# Patient Record
Sex: FEMALE | Race: OTHER | Employment: UNEMPLOYED | URBAN - METROPOLITAN AREA
[De-identification: names, ages, dates, MRNs, and addresses within clinical notes are randomized per-mention and may not be internally consistent; named-entity substitution may affect disease eponyms.]

---

## 2023-10-23 ENCOUNTER — HOSPITAL ENCOUNTER (OUTPATIENT)
Dept: MAMMOGRAPHY | Age: 43
Discharge: HOME | End: 2023-10-25
Payer: COMMERCIAL

## 2023-10-23 DIAGNOSIS — Z12.31 SCREENING MAMMOGRAM FOR BREAST CANCER: ICD-10-CM

## 2023-10-23 PROCEDURE — 77063 BREAST TOMOSYNTHESIS BI: CPT

## 2024-05-24 ENCOUNTER — OFFICE VISIT (OUTPATIENT)
Dept: FAMILY MEDICINE CLINIC | Age: 44
End: 2024-05-24
Payer: COMMERCIAL

## 2024-05-24 ENCOUNTER — TELEPHONE (OUTPATIENT)
Dept: FAMILY MEDICINE CLINIC | Age: 44
End: 2024-05-24

## 2024-05-24 VITALS
SYSTOLIC BLOOD PRESSURE: 124 MMHG | BODY MASS INDEX: 28 KG/M2 | WEIGHT: 164 LBS | HEIGHT: 64 IN | DIASTOLIC BLOOD PRESSURE: 82 MMHG | OXYGEN SATURATION: 98 % | HEART RATE: 79 BPM

## 2024-05-24 DIAGNOSIS — Z13.0 SCREENING FOR IRON DEFICIENCY ANEMIA: ICD-10-CM

## 2024-05-24 DIAGNOSIS — Z76.89 ESTABLISHING CARE WITH NEW DOCTOR, ENCOUNTER FOR: ICD-10-CM

## 2024-05-24 DIAGNOSIS — E55.9 VITAMIN D DEFICIENCY: ICD-10-CM

## 2024-05-24 DIAGNOSIS — E58 CALCIUM DEFICIENCY: ICD-10-CM

## 2024-05-24 DIAGNOSIS — Z12.4 CERVICAL CANCER SCREENING: ICD-10-CM

## 2024-05-24 DIAGNOSIS — Z13.220 SCREENING FOR HYPERLIPIDEMIA: ICD-10-CM

## 2024-05-24 DIAGNOSIS — I10 PRIMARY HYPERTENSION: Chronic | ICD-10-CM

## 2024-05-24 DIAGNOSIS — Z13.6 SCREENING FOR CARDIOVASCULAR CONDITION: ICD-10-CM

## 2024-05-24 DIAGNOSIS — E53.8 VITAMIN B12 DEFICIENCY: ICD-10-CM

## 2024-05-24 DIAGNOSIS — E11.9 TYPE 2 DIABETES MELLITUS WITHOUT COMPLICATION, WITH LONG-TERM CURRENT USE OF INSULIN (HCC): Primary | Chronic | ICD-10-CM

## 2024-05-24 DIAGNOSIS — E78.5 HYPERLIPIDEMIA, UNSPECIFIED HYPERLIPIDEMIA TYPE: Chronic | ICD-10-CM

## 2024-05-24 DIAGNOSIS — Z13.29 THYROID DISORDER SCREEN: ICD-10-CM

## 2024-05-24 DIAGNOSIS — Z79.4 TYPE 2 DIABETES MELLITUS WITHOUT COMPLICATION, WITH LONG-TERM CURRENT USE OF INSULIN (HCC): Primary | Chronic | ICD-10-CM

## 2024-05-24 LAB — HBA1C MFR BLD: 6.9 %

## 2024-05-24 PROCEDURE — 3074F SYST BP LT 130 MM HG: CPT

## 2024-05-24 PROCEDURE — 3079F DIAST BP 80-89 MM HG: CPT

## 2024-05-24 PROCEDURE — 99202 OFFICE O/P NEW SF 15 MIN: CPT

## 2024-05-24 PROCEDURE — 3044F HG A1C LEVEL LT 7.0%: CPT

## 2024-05-24 PROCEDURE — 83036 HEMOGLOBIN GLYCOSYLATED A1C: CPT

## 2024-05-24 RX ORDER — SEMAGLUTIDE 0.68 MG/ML
0.25 INJECTION, SOLUTION SUBCUTANEOUS WEEKLY
Qty: 3 ML | Refills: 1 | Status: SHIPPED | OUTPATIENT
Start: 2024-05-24 | End: 2024-05-24

## 2024-05-24 RX ORDER — ATORVASTATIN CALCIUM 20 MG/1
20 TABLET, FILM COATED ORAL EVERY EVENING
Qty: 90 TABLET | Refills: 0 | Status: SHIPPED | OUTPATIENT
Start: 2024-05-24 | End: 2024-08-22

## 2024-05-24 RX ORDER — ENALAPRIL MALEATE 2.5 MG/1
2.5 TABLET ORAL DAILY
Qty: 90 TABLET | Refills: 0 | Status: SHIPPED | OUTPATIENT
Start: 2024-05-24 | End: 2024-08-22

## 2024-05-24 RX ORDER — ENALAPRIL MALEATE 2.5 MG/1
2.5 TABLET ORAL DAILY
COMMUNITY
Start: 2023-10-26 | End: 2024-05-24 | Stop reason: SDUPTHER

## 2024-05-24 RX ORDER — ATORVASTATIN CALCIUM 20 MG/1
20 TABLET, FILM COATED ORAL EVERY EVENING
COMMUNITY
Start: 2023-06-05 | End: 2024-05-24 | Stop reason: SDUPTHER

## 2024-05-24 SDOH — ECONOMIC STABILITY: FOOD INSECURITY: WITHIN THE PAST 12 MONTHS, THE FOOD YOU BOUGHT JUST DIDN'T LAST AND YOU DIDN'T HAVE MONEY TO GET MORE.: NEVER TRUE

## 2024-05-24 SDOH — ECONOMIC STABILITY: FOOD INSECURITY: WITHIN THE PAST 12 MONTHS, YOU WORRIED THAT YOUR FOOD WOULD RUN OUT BEFORE YOU GOT MONEY TO BUY MORE.: NEVER TRUE

## 2024-05-24 SDOH — ECONOMIC STABILITY: INCOME INSECURITY: HOW HARD IS IT FOR YOU TO PAY FOR THE VERY BASICS LIKE FOOD, HOUSING, MEDICAL CARE, AND HEATING?: NOT HARD AT ALL

## 2024-05-24 SDOH — ECONOMIC STABILITY: HOUSING INSECURITY
IN THE LAST 12 MONTHS, WAS THERE A TIME WHEN YOU DID NOT HAVE A STEADY PLACE TO SLEEP OR SLEPT IN A SHELTER (INCLUDING NOW)?: NO

## 2024-05-24 ASSESSMENT — PATIENT HEALTH QUESTIONNAIRE - PHQ9
SUM OF ALL RESPONSES TO PHQ QUESTIONS 1-9: 8
5. POOR APPETITE OR OVEREATING: SEVERAL DAYS
SUM OF ALL RESPONSES TO PHQ QUESTIONS 1-9: 8
4. FEELING TIRED OR HAVING LITTLE ENERGY: MORE THAN HALF THE DAYS
1. LITTLE INTEREST OR PLEASURE IN DOING THINGS: SEVERAL DAYS
3. TROUBLE FALLING OR STAYING ASLEEP: MORE THAN HALF THE DAYS
10. IF YOU CHECKED OFF ANY PROBLEMS, HOW DIFFICULT HAVE THESE PROBLEMS MADE IT FOR YOU TO DO YOUR WORK, TAKE CARE OF THINGS AT HOME, OR GET ALONG WITH OTHER PEOPLE: NOT DIFFICULT AT ALL
8. MOVING OR SPEAKING SO SLOWLY THAT OTHER PEOPLE COULD HAVE NOTICED. OR THE OPPOSITE, BEING SO FIGETY OR RESTLESS THAT YOU HAVE BEEN MOVING AROUND A LOT MORE THAN USUAL: NOT AT ALL
9. THOUGHTS THAT YOU WOULD BE BETTER OFF DEAD, OR OF HURTING YOURSELF: NOT AT ALL
2. FEELING DOWN, DEPRESSED OR HOPELESS: SEVERAL DAYS
SUM OF ALL RESPONSES TO PHQ QUESTIONS 1-9: 8
6. FEELING BAD ABOUT YOURSELF - OR THAT YOU ARE A FAILURE OR HAVE LET YOURSELF OR YOUR FAMILY DOWN: NOT AT ALL
7. TROUBLE CONCENTRATING ON THINGS, SUCH AS READING THE NEWSPAPER OR WATCHING TELEVISION: SEVERAL DAYS
SUM OF ALL RESPONSES TO PHQ QUESTIONS 1-9: 8
SUM OF ALL RESPONSES TO PHQ9 QUESTIONS 1 & 2: 2

## 2024-05-24 NOTE — PROGRESS NOTES
MPHX Shanthi   [address] [facility]  [id number]  5/30/2024    Raquel Dickey is a 44 y.o. female who presents today for her medical conditions and/or complaints as noted below.    Raquel Dickey is scheduled today for Diabetes, Establish Care, and Other (Pap & Mammogram orders /Hormonal testing )  .      HPI:     Patient is here today to establish care and discuss medication refills.  Patient reports a PMH of primary hypertension, type 2 diabetes with insulin use and hyperlipidemia.  Patient states she is been off of her insulin since October 2024.  A1c in the office today 6.9% which is at goal range.  She denies any recent episodes of needing to be hospitalized or contact emergency services for hyperglycemia or hypoglycemia.  Blood pressure in the office today is 124/82 which is at goal range.  Patient states she tolerates all her current medications without any side effects.    Patient has no further concerns for today's visit.  Previous notes reviewed in the chart.        Vitals:    05/24/24 0900   BP: 124/82   Pulse: 79   SpO2: 98%   Weight: 74.4 kg (164 lb)   Height: 1.626 m (5' 4\")      No past medical history on file.   No past surgical history on file.  Family History   Problem Relation Age of Onset    Diabetes Mother     Cancer Father      Social History     Tobacco Use    Smoking status: Never    Smokeless tobacco: Never   Substance Use Topics    Alcohol use: Never      Current Outpatient Medications   Medication Sig Dispense Refill    atorvastatin (LIPITOR) 20 MG tablet Take 1 tablet by mouth every evening 90 tablet 0    Continuous Glucose Sensor (FREESTYLE ROCIO 2 SENSOR) [facility] USE AS DIRECTED 3 each 2    enalapril (VASOTEC) 2.5 MG tablet Take 1 tablet by mouth daily 90 tablet 0    metFORMIN (GLUCOPHAGE) 1000 MG tablet Take 1 tablet by mouth 2 times daily (with meals) 60 tablet 1    Oyster Shell 500 MG TABS Take 1 tablet by mouth daily 90 tablet 0     No current facility-administered medications for

## 2024-05-30 PROBLEM — I10 PRIMARY HYPERTENSION: Chronic | Status: ACTIVE | Noted: 2024-05-24

## 2024-05-30 PROBLEM — E78.5 HYPERLIPIDEMIA: Chronic | Status: ACTIVE | Noted: 2024-05-24

## 2024-05-30 ASSESSMENT — ENCOUNTER SYMPTOMS
SINUS PRESSURE: 0
TROUBLE SWALLOWING: 0
SHORTNESS OF BREATH: 0
SINUS PAIN: 0
EYE REDNESS: 0
NAUSEA: 0
ABDOMINAL PAIN: 0
COUGH: 0
CHEST TIGHTNESS: 0
WHEEZING: 0
EYE ITCHING: 0
EYE DISCHARGE: 0
VOMITING: 0
SORE THROAT: 0
DIARRHEA: 0

## 2024-05-30 NOTE — ASSESSMENT & PLAN NOTE
Borderline controlled, continue current medications, continue current treatment plan, medication adherence emphasized, and lifestyle modifications recommended.    Diabetes education provided today:    Diabetic Neuropathy: signs and therapy.  Foot care: advised to wash feet daily, pat dry and apply lotion at night, avoiding between toes. Need to look at feet daily and report to a physician any signs of inflammation or skin damage. Discussed diabetes shoes and socks.  Diabetic retinopathy: the most frequent cause of blindness in [place] currently. Measures to prevent that.  Diabetic nephropathy: Kidney function, microalbumin as a sign of diabetic nephropathy. Stages of kidney disease.  Nutrition as a mainstream of diabetes therapy. Kinsley about label reading.  Carbs: good carbs and bad carbs, importance of carb counting, incorporation of protein with each meal to reduce Glycemic index, importance of portions, Carb/insulin ratio.  Fats: Good fats and bad fats, meal planning and supplements.  Physical activity: advised to exercise 5-7 days a week 30-60 mins at least. Discussed how it affects BS readings.  Continuous Glucose monitor. How it works and checks blood sugars every 5 min. for 4 days during our tests.  Different diabetic medications.  Rotation of sites for subcutaneous medication injection.

## 2024-11-06 DIAGNOSIS — E11.9 TYPE 2 DIABETES MELLITUS WITHOUT COMPLICATION, WITH LONG-TERM CURRENT USE OF INSULIN (HCC): Chronic | ICD-10-CM

## 2024-11-06 DIAGNOSIS — Z79.4 TYPE 2 DIABETES MELLITUS WITHOUT COMPLICATION, WITH LONG-TERM CURRENT USE OF INSULIN (HCC): Chronic | ICD-10-CM

## 2024-11-06 NOTE — TELEPHONE ENCOUNTER
Requested Prescriptions     Pending Prescriptions Disp Refills    metFORMIN (GLUCOPHAGE) 1000 MG tablet 60 tablet 1     Sig: Take 1 tablet by mouth 2 times daily (with meals)

## 2024-11-08 DIAGNOSIS — E11.9 TYPE 2 DIABETES MELLITUS WITHOUT COMPLICATION, WITH LONG-TERM CURRENT USE OF INSULIN (HCC): Chronic | ICD-10-CM

## 2024-11-08 DIAGNOSIS — Z79.4 TYPE 2 DIABETES MELLITUS WITHOUT COMPLICATION, WITH LONG-TERM CURRENT USE OF INSULIN (HCC): Chronic | ICD-10-CM

## 2024-11-08 NOTE — TELEPHONE ENCOUNTER
Raquel Dickey is calling to request a refill on the following medication(s):    Medication Request:  Requested Prescriptions     Pending Prescriptions Disp Refills    metFORMIN (GLUCOPHAGE) 1000 MG tablet [Pharmacy Med Name: metFORMIN HCl Oral Tablet 1000 MG] 60 tablet 0     Sig: TAKE 1 TABLET BY MOUTH 2 TIMES A DAY WITH MEALS       Last Visit Date (If Applicable):  Visit date not found    Next Visit Date:    Visit date not found

## 2024-11-20 DIAGNOSIS — Z79.4 TYPE 2 DIABETES MELLITUS WITHOUT COMPLICATION, WITH LONG-TERM CURRENT USE OF INSULIN (HCC): Chronic | ICD-10-CM

## 2024-11-20 DIAGNOSIS — E11.9 TYPE 2 DIABETES MELLITUS WITHOUT COMPLICATION, WITH LONG-TERM CURRENT USE OF INSULIN (HCC): Chronic | ICD-10-CM

## 2024-11-20 NOTE — TELEPHONE ENCOUNTER
Raquel Dickey is calling to request a refill on the following medication(s):    Medication Request:  Requested Prescriptions     Pending Prescriptions Disp Refills    Continuous Glucose Sensor (FREESTYLE ROCIO 2 SENSOR) MISC [Pharmacy Med Name: FreeStyle Rocio 2 Sensor Miscellaneous] 1 each 0     Sig: USE AS DIRECTED. CHANGE SENSOR EVERY 14 DAYS       Last Visit Date (If Applicable):  5/24/2024    Next Visit Date:    Visit date not found

## 2024-12-10 ENCOUNTER — TELEMEDICINE (OUTPATIENT)
Dept: FAMILY MEDICINE CLINIC | Age: 44
End: 2024-12-10

## 2024-12-10 DIAGNOSIS — Z79.4 TYPE 2 DIABETES MELLITUS WITHOUT COMPLICATION, WITH LONG-TERM CURRENT USE OF INSULIN (HCC): Chronic | ICD-10-CM

## 2024-12-10 DIAGNOSIS — I10 PRIMARY HYPERTENSION: Chronic | ICD-10-CM

## 2024-12-10 DIAGNOSIS — Z13.0 SCREENING FOR IRON DEFICIENCY ANEMIA: Primary | ICD-10-CM

## 2024-12-10 DIAGNOSIS — E55.9 VITAMIN D DEFICIENCY: ICD-10-CM

## 2024-12-10 DIAGNOSIS — E78.5 HYPERLIPIDEMIA, UNSPECIFIED HYPERLIPIDEMIA TYPE: Chronic | ICD-10-CM

## 2024-12-10 DIAGNOSIS — Z13.220 SCREENING FOR HYPERLIPIDEMIA: ICD-10-CM

## 2024-12-10 DIAGNOSIS — Z12.4 CERVICAL CANCER SCREENING: ICD-10-CM

## 2024-12-10 DIAGNOSIS — E53.8 VITAMIN B12 DEFICIENCY: ICD-10-CM

## 2024-12-10 DIAGNOSIS — Z13.29 THYROID DISORDER SCREEN: ICD-10-CM

## 2024-12-10 DIAGNOSIS — Z13.6 SCREENING FOR CARDIOVASCULAR CONDITION: ICD-10-CM

## 2024-12-10 DIAGNOSIS — E11.9 TYPE 2 DIABETES MELLITUS WITHOUT COMPLICATION, WITH LONG-TERM CURRENT USE OF INSULIN (HCC): Chronic | ICD-10-CM

## 2024-12-10 DIAGNOSIS — Z86.39 HISTORY OF HYPERGLYCEMIA: ICD-10-CM

## 2024-12-10 RX ORDER — ENALAPRIL MALEATE 2.5 MG/1
2.5 TABLET ORAL DAILY
Qty: 90 TABLET | Refills: 0 | Status: SHIPPED | OUTPATIENT
Start: 2024-12-10 | End: 2025-03-10

## 2024-12-10 RX ORDER — ATORVASTATIN CALCIUM 20 MG/1
20 TABLET, FILM COATED ORAL EVERY EVENING
Qty: 90 TABLET | Refills: 1 | Status: SHIPPED | OUTPATIENT
Start: 2024-12-10 | End: 2025-06-08

## 2024-12-10 RX ORDER — OYSTER SHELL CALCIUM WITH VITAMIN D 500; 200 MG/1; [IU]/1
1 TABLET, FILM COATED ORAL EVERY MORNING
COMMUNITY
Start: 2024-05-30 | End: 2025-05-31

## 2024-12-10 ASSESSMENT — ENCOUNTER SYMPTOMS
VOMITING: 0
SINUS PAIN: 0
DIARRHEA: 0
EYE ITCHING: 0
WHEEZING: 0
TROUBLE SWALLOWING: 0
EYE DISCHARGE: 0
EYE REDNESS: 0
SINUS PRESSURE: 0
SORE THROAT: 0
COUGH: 0
ABDOMINAL PAIN: 0
CHEST TIGHTNESS: 0
NAUSEA: 0
SHORTNESS OF BREATH: 0

## 2024-12-10 NOTE — ASSESSMENT & PLAN NOTE
Chronic, at goal (stable), continue current treatment plan, medication adherence emphasized, and lifestyle modifications recommended    Orders:    enalapril (VASOTEC) 2.5 MG tablet; Take 1 tablet by mouth daily

## 2024-12-10 NOTE — ASSESSMENT & PLAN NOTE
Chronic, at goal (stable), continue current treatment plan, medication adherence emphasized, and lifestyle modifications recommended    Orders:    Continuous Glucose Sensor (FREESTYLE ROCIO 2 SENSOR) [facility]; USE AS DIRECTED. CHANGE SENSOR EVERY 14 DAYS    metFORMIN (GLUCOPHAGE) 1000 MG tablet; Take 1 tablet by mouth 2 times daily (with meals)

## 2024-12-10 NOTE — PROGRESS NOTES
Raquel Dickey, was evaluated through a synchronous (real-time) audio-video encounter. The patient (or guardian if applicable) is aware that this is a billable service, which includes applicable co-pays. This Virtual Visit was conducted with patient's (and/or legal guardian's) consent. Patient identification was verified, and a caregiver was present when appropriate.   The patient was located at Home: [address] [place] [address] Dr M[address] [place] [address]  Provider was located at Facility (Appt Dept): [address] [place] [address] YOLI Maki  [place] [address]  Confirm you are appropriately licensed, registered, or certified to deliver care in the state where the patient is located as indicated above. If you are not or unsure, please re-schedule the visit: Yes, I confirm.     Raquel Dickey (:  1980) is a Established patient, presenting virtually for evaluation of the following:      Below is the assessment and plan developed based on review of pertinent history, physical exam, labs, studies, and medications.     Assessment & Plan  Screening for iron deficiency anemia    Orders:    CBC with Auto Differential; Future    Screening for hyperlipidemia    Orders:    Lipid, Fasting; Future    Thyroid disorder screen     Orders:    TSH with Reflex; Future    Vitamin D deficiency     Orders:    Vitamin D 25 Hydroxy; Future    Screening for cardiovascular condition     Orders:    Comprehensive Metabolic Panel; Future    Vitamin B12 deficiency     Orders:    Vitamin B12; Future    History of hyperglycemia     Orders:    Hemoglobin A1C; Future    Hyperlipidemia, unspecified hyperlipidemia type   Chronic, at goal (stable), continue current treatment plan, medication adherence emphasized, and lifestyle modifications recommended    Orders:    atorvastatin (LIPITOR) 20 MG tablet; Take 1 tablet by mouth every evening    Type 2 diabetes mellitus without complication, with long-term current use of insulin (HCC)   Chronic, at goal (stable), continue current

## 2024-12-10 NOTE — ASSESSMENT & PLAN NOTE
Chronic, at goal (stable), continue current treatment plan, medication adherence emphasized, and lifestyle modifications recommended    Orders:    atorvastatin (LIPITOR) 20 MG tablet; Take 1 tablet by mouth every evening

## 2025-03-04 ENCOUNTER — OFFICE VISIT (OUTPATIENT)
Dept: OBGYN CLINIC | Age: 45
End: 2025-03-04

## 2025-03-04 ENCOUNTER — HOSPITAL ENCOUNTER (OUTPATIENT)
Age: 45
Setting detail: SPECIMEN
Discharge: HOME | End: 2025-03-04

## 2025-03-04 VITALS — BODY MASS INDEX: 28.84 KG/M2 | WEIGHT: 168 LBS | DIASTOLIC BLOOD PRESSURE: 84 MMHG | SYSTOLIC BLOOD PRESSURE: 120 MMHG

## 2025-03-04 DIAGNOSIS — Z01.419 WELL WOMAN EXAM WITH ROUTINE GYNECOLOGICAL EXAM: Primary | ICD-10-CM

## 2025-03-04 DIAGNOSIS — Z12.31 ENCOUNTER FOR SCREENING MAMMOGRAM FOR MALIGNANT NEOPLASM OF BREAST: ICD-10-CM

## 2025-03-04 DIAGNOSIS — N92.0 MENORRHAGIA WITH REGULAR CYCLE: ICD-10-CM

## 2025-03-04 RX ORDER — NORETHINDRONE ACETATE AND ETHINYL ESTRADIOL .03; 1.5 MG/1; MG/1
1 TABLET ORAL DAILY
Qty: 3 PACKET | Refills: 4 | Status: SHIPPED | OUTPATIENT
Start: 2025-03-04 | End: 2025-03-25

## 2025-03-04 SDOH — ECONOMIC STABILITY: FOOD INSECURITY: WITHIN THE PAST 12 MONTHS, THE FOOD YOU BOUGHT JUST DIDN'T LAST AND YOU DIDN'T HAVE MONEY TO GET MORE.: NEVER TRUE

## 2025-03-04 SDOH — ECONOMIC STABILITY: FOOD INSECURITY: WITHIN THE PAST 12 MONTHS, YOU WORRIED THAT YOUR FOOD WOULD RUN OUT BEFORE YOU GOT MONEY TO BUY MORE.: NEVER TRUE

## 2025-03-04 ASSESSMENT — PATIENT HEALTH QUESTIONNAIRE - PHQ9
SUM OF ALL RESPONSES TO PHQ QUESTIONS 1-9: 0
2. FEELING DOWN, DEPRESSED OR HOPELESS: NOT AT ALL
1. LITTLE INTEREST OR PLEASURE IN DOING THINGS: NOT AT ALL
SUM OF ALL RESPONSES TO PHQ QUESTIONS 1-9: 0

## 2025-03-04 NOTE — PROGRESS NOTES
check.     Diagnosis Orders   1. Well woman exam with routine gynecological exam        2. Encounter for screening mammogram for malignant neoplasm of breast        3. Menorrhagia with regular cycle             Patient Active Problem List    Diagnosis Date Noted    Type 2 diabetes mellitus without complication, with long-term current use of insulin (HCC) 05/24/2024    Hyperlipidemia 05/24/2024    Calcium deficiency 05/24/2024    Primary hypertension 05/24/2024         Counseling Completed:    Discussed need for repeat pap as per American Society for Colposcopy and Cervical Pathology guidelines.  Discussed need for mammograms every 1 year, If >41 yo and last mammogram was negative.  Discussed Calcium and Vitamin D dosing.  Discussed need for colonoscopy screening as well as onset for bone density testing.  Discussed birth control and barrier recommendations.  Discussed STD counseling and prevention.  Discussed Gardisil counseling for all patients 9-44 yo.  Hereditary Breast, Ovarian, Colon and Uterine Cancer screening discussed.          Tobacco & Secondary smoke risks discussed; with recommendation for cessation and avoidance.  Routine health maintenance per patients PCP discussed.      DO Delores Soliz [facility]  [id number]  [facility], [address]  3/4/2025, 11:16 AM

## 2025-03-11 ENCOUNTER — HOSPITAL ENCOUNTER (OUTPATIENT)
Age: 45
Discharge: HOME | End: 2025-03-13
Attending: STUDENT IN AN ORGANIZED HEALTH CARE EDUCATION/TRAINING PROGRAM

## 2025-03-11 ENCOUNTER — RESULTS FOLLOW-UP (OUTPATIENT)
Dept: LAB | Age: 45
End: 2025-03-11

## 2025-03-11 ENCOUNTER — RESULTS FOLLOW-UP (OUTPATIENT)
Age: 45
End: 2025-03-11

## 2025-03-11 DIAGNOSIS — Z12.31 ENCOUNTER FOR SCREENING MAMMOGRAM FOR MALIGNANT NEOPLASM OF BREAST: ICD-10-CM

## 2025-03-11 LAB — CYTOLOGY REPORT: NORMAL

## 2025-03-11 PROCEDURE — 77063 BREAST TOMOSYNTHESIS BI: CPT

## 2025-03-12 DIAGNOSIS — Z01.419 WELL WOMAN EXAM WITH ROUTINE GYNECOLOGICAL EXAM: Primary | ICD-10-CM

## 2025-03-13 LAB
HPV I/H RISK 4 DNA CVX QL NAA+PROBE: NOT DETECTED
HPV SAMPLE: NORMAL
HPV, INTERPRETATION: NORMAL
HPV16 DNA CVX QL NAA+PROBE: NOT DETECTED
HPV18 DNA CVX QL NAA+PROBE: NOT DETECTED
SPECIMEN DESCRIPTION: NORMAL

## 2025-05-02 ENCOUNTER — HOSPITAL ENCOUNTER (OUTPATIENT)
Age: 45
Setting detail: SPECIMEN
Discharge: HOME | End: 2025-05-02

## 2025-05-02 DIAGNOSIS — Z13.6 SCREENING FOR CARDIOVASCULAR CONDITION: ICD-10-CM

## 2025-05-02 DIAGNOSIS — Z13.220 SCREENING FOR HYPERLIPIDEMIA: ICD-10-CM

## 2025-05-02 DIAGNOSIS — E53.8 VITAMIN B12 DEFICIENCY: ICD-10-CM

## 2025-05-02 DIAGNOSIS — Z13.0 SCREENING FOR IRON DEFICIENCY ANEMIA: ICD-10-CM

## 2025-05-02 DIAGNOSIS — Z86.39 HISTORY OF HYPERGLYCEMIA: ICD-10-CM

## 2025-05-02 DIAGNOSIS — E55.9 VITAMIN D DEFICIENCY: ICD-10-CM

## 2025-05-02 DIAGNOSIS — Z13.29 THYROID DISORDER SCREEN: ICD-10-CM

## 2025-05-02 LAB
25(OH)D3 SERPL-MCNC: 19.7 NG/ML (ref 30–100)
ALBUMIN SERPL-MCNC: 4.2 G/DL (ref 3.5–5.2)
ALBUMIN/GLOB SERPL: 1.5 {RATIO} (ref 1–2.5)
ALP SERPL-CCNC: 66 U/L (ref 35–104)
ALT SERPL-CCNC: 31 U/L (ref 10–35)
ANION GAP SERPL CALCULATED.3IONS-SCNC: 13 MMOL/L (ref 9–16)
AST SERPL-CCNC: 19 U/L (ref 10–35)
BASOPHILS # BLD: 0.09 K/UL (ref 0–0.2)
BASOPHILS NFR BLD: 1 % (ref 0–2)
BILIRUB SERPL-MCNC: 0.3 MG/DL (ref 0–1.2)
BUN SERPL-MCNC: 9 MG/DL (ref 6–20)
CALCIUM SERPL-MCNC: 9.2 MG/DL (ref 8.6–10.4)
CHLORIDE SERPL-SCNC: 101 MMOL/L (ref 98–107)
CHOLEST SERPL-MCNC: 138 MG/DL (ref 0–199)
CHOLESTEROL/HDL RATIO: 3.6
CO2 SERPL-SCNC: 23 MMOL/L (ref 20–31)
CREAT SERPL-MCNC: 0.5 MG/DL (ref 0.6–0.9)
EOSINOPHIL # BLD: 0.1 K/UL (ref 0–0.44)
EOSINOPHILS RELATIVE PERCENT: 1 % (ref 1–4)
ERYTHROCYTE [DISTWIDTH] IN BLOOD BY AUTOMATED COUNT: 13.2 % (ref 11.8–14.4)
EST. AVERAGE GLUCOSE BLD GHB EST-MCNC: 140 MG/DL
GFR, ESTIMATED: >90 ML/MIN/1.73M2
GLUCOSE SERPL-MCNC: 162 MG/DL (ref 74–99)
HBA1C MFR BLD: 6.5 % (ref 4–6)
HCT VFR BLD AUTO: 37 % (ref 36.3–47.1)
HDLC SERPL-MCNC: 38 MG/DL
HGB BLD-MCNC: 11.3 G/DL (ref 11.9–15.1)
IMM GRANULOCYTES # BLD AUTO: <0.03 K/UL (ref 0–0.3)
IMM GRANULOCYTES NFR BLD: 0 %
LDLC SERPL CALC-MCNC: 50 MG/DL (ref 0–100)
LYMPHOCYTES NFR BLD: 1.96 K/UL (ref 1.1–3.7)
LYMPHOCYTES RELATIVE PERCENT: 27 % (ref 24–43)
MCH RBC QN AUTO: 25.2 PG (ref 25.2–33.5)
MCHC RBC AUTO-ENTMCNC: 30.5 G/DL (ref 28.4–34.8)
MCV RBC AUTO: 82.6 FL (ref 82.6–102.9)
MONOCYTES NFR BLD: 0.38 K/UL (ref 0.1–1.2)
MONOCYTES NFR BLD: 5 % (ref 3–12)
NEUTROPHILS NFR BLD: 66 % (ref 36–65)
NEUTS SEG NFR BLD: 4.65 K/UL (ref 1.5–8.1)
NRBC BLD-RTO: 0 PER 100 WBC
PLATELET # BLD AUTO: 323 K/UL (ref 138–453)
PMV BLD AUTO: 10.8 FL (ref 8.1–13.5)
POTASSIUM SERPL-SCNC: 4.1 MMOL/L (ref 3.7–5.3)
PROT SERPL-MCNC: 7 G/DL (ref 6.6–8.7)
RBC # BLD AUTO: 4.48 M/UL (ref 3.95–5.11)
SODIUM SERPL-SCNC: 137 MMOL/L (ref 136–145)
TRIGL SERPL-MCNC: 250 MG/DL (ref 0–149)
TSH SERPL DL<=0.05 MIU/L-ACNC: 0.81 UIU/ML (ref 0.27–4.2)
VIT B12 SERPL-MCNC: 494 PG/ML (ref 232–1245)
VLDLC SERPL CALC-MCNC: 50 MG/DL (ref 1–30)
WBC OTHER # BLD: 7.2 K/UL (ref 3.5–11.3)

## 2025-05-06 ENCOUNTER — OFFICE VISIT (OUTPATIENT)
Dept: OBGYN CLINIC | Age: 45
End: 2025-05-06

## 2025-05-06 VITALS — BODY MASS INDEX: 28.67 KG/M2 | SYSTOLIC BLOOD PRESSURE: 128 MMHG | WEIGHT: 167 LBS | DIASTOLIC BLOOD PRESSURE: 72 MMHG

## 2025-05-06 DIAGNOSIS — N92.0 MENORRHAGIA WITH REGULAR CYCLE: Primary | ICD-10-CM

## 2025-05-06 PROCEDURE — 99212 OFFICE O/P EST SF 10 MIN: CPT | Performed by: STUDENT IN AN ORGANIZED HEALTH CARE EDUCATION/TRAINING PROGRAM

## 2025-05-06 PROCEDURE — 3074F SYST BP LT 130 MM HG: CPT | Performed by: STUDENT IN AN ORGANIZED HEALTH CARE EDUCATION/TRAINING PROGRAM

## 2025-05-06 PROCEDURE — 3078F DIAST BP <80 MM HG: CPT | Performed by: STUDENT IN AN ORGANIZED HEALTH CARE EDUCATION/TRAINING PROGRAM

## 2025-05-06 RX ORDER — NORETHINDRONE ACETATE AND ETHINYL ESTRADIOL .03; 1.5 MG/1; MG/1
1 TABLET ORAL DAILY
Qty: 3 PACKET | Refills: 4 | Status: SHIPPED | OUTPATIENT
Start: 2025-05-06 | End: 2025-05-27

## 2025-05-06 NOTE — PROGRESS NOTES
OB/GYN Follow up Visit    Raquel Dickey  2025                       Primary Care Physician: Lefty Jin APRN - CNP    CC:   Chief Complaint   Patient presents with    Medication Check         HPI: Raquel Dickey is a 44 y.o. female   Patient's last menstrual period was 2025 (approximate).    The patient was seen and examined.   -placed on COCP for menorrhagia. Started meds two months ago. First menses was still heavy but second was much better.   -She is very happy with results and would like to continue using the pills to control menses  -we also discussed continuous method and that I would recommend a withdraw bleed once every 3 months to prevent breakthrough bleeding.      REVIEW OF SYSTEMS:   -Constitutional: (-) fever, (-) chills  -HEENT: (-) visual disturbances, (-)headaches  -Respiratory: (-) dyspnea, (-) cough  -Cardiovascular: (-) chest pain, (-) palpitations  -Gastrointestinal: (-) abdominal pain, (-) N/V, (-) diarrhea, (-) constipation  -Genitourinary: (-) vaginal discharge  -Hematologic: (-) bruising  -Immunologic/Lymphatic: (-) recent illness, (-) recent sick contact    ________________________________________________________________________      OBSTETRICAL HISTORY:  OB History    Para Term  AB Living   3 3 3      SAB IAB Ectopic Molar Multiple Live Births        3      # Outcome Date GA Lbr Adonis/2nd Weight Sex Type Anes PTL Lv   3 Term            2 Term            1 Term                PAST MEDICAL HISTORY:  No past medical history on file.    PAST SURGICAL HISTORY:                                                                No past surgical history on file.    MEDICATIONS:  Current Outpatient Medications   Medication Sig Dispense Refill    Norethindrone Acet-Ethinyl Est (LOESTRIN 1.5/30, 21,) 1.5-30 MG-MCG TABS Take 1 tablet by mouth daily for 21 days 3 packet 4    calcium-vitamin D (OSCAL-500) 500-5 MG-MCG TABS per tablet Take 1 tablet by mouth every morning

## 2025-05-09 ENCOUNTER — RESULTS FOLLOW-UP (OUTPATIENT)
Dept: FAMILY MEDICINE CLINIC | Age: 45
End: 2025-05-09

## 2025-05-09 DIAGNOSIS — E55.9 VITAMIN D DEFICIENCY: Primary | ICD-10-CM

## 2025-05-09 RX ORDER — ERGOCALCIFEROL 1.25 MG/1
50000 CAPSULE, LIQUID FILLED ORAL WEEKLY
Qty: 12 CAPSULE | Refills: 1 | Status: SHIPPED | OUTPATIENT
Start: 2025-05-09

## 2025-05-16 ENCOUNTER — TELEPHONE (OUTPATIENT)
Dept: FAMILY MEDICINE CLINIC | Age: 45
End: 2025-05-16

## 2025-05-16 DIAGNOSIS — E11.9 TYPE 2 DIABETES MELLITUS WITHOUT COMPLICATION, WITH LONG-TERM CURRENT USE OF INSULIN (HCC): Primary | Chronic | ICD-10-CM

## 2025-05-16 DIAGNOSIS — Z79.4 TYPE 2 DIABETES MELLITUS WITHOUT COMPLICATION, WITH LONG-TERM CURRENT USE OF INSULIN (HCC): Primary | Chronic | ICD-10-CM

## 2025-05-16 NOTE — TELEPHONE ENCOUNTER
Patient  called and stated that patient blood sugar has been steadily increasing  to high 190s to 200s despite taking her medications.

## 2025-05-19 NOTE — TELEPHONE ENCOUNTER
Patient  cancelled appointment for today due to them having to stay home with their child. They are requesting an alternative to ozempic due to her blood sugar being uncontrollable.

## 2025-05-20 RX ORDER — GLIPIZIDE 5 MG/1
5 TABLET ORAL 2 TIMES DAILY
Qty: 60 TABLET | Refills: 3 | Status: SHIPPED | OUTPATIENT
Start: 2025-05-20

## 2025-05-23 ENCOUNTER — TELEPHONE (OUTPATIENT)
Dept: FAMILY MEDICINE CLINIC | Age: 45
End: 2025-05-23

## 2025-05-23 DIAGNOSIS — E11.9 TYPE 2 DIABETES MELLITUS WITHOUT COMPLICATION, WITH LONG-TERM CURRENT USE OF INSULIN (HCC): Primary | Chronic | ICD-10-CM

## 2025-05-23 DIAGNOSIS — Z79.4 TYPE 2 DIABETES MELLITUS WITHOUT COMPLICATION, WITH LONG-TERM CURRENT USE OF INSULIN (HCC): Primary | Chronic | ICD-10-CM

## 2025-05-23 RX ORDER — PEN NEEDLE, DIABETIC 31 GX5/16"
1 NEEDLE, DISPOSABLE MISCELLANEOUS DAILY
Qty: 100 EACH | Refills: 11 | Status: SHIPPED | OUTPATIENT
Start: 2025-05-23

## 2025-05-23 RX ORDER — INSULIN GLARGINE 100 [IU]/ML
15 INJECTION, SOLUTION SUBCUTANEOUS 2 TIMES DAILY
Qty: 5 ADJUSTABLE DOSE PRE-FILLED PEN SYRINGE | Refills: 0 | Status: SHIPPED | OUTPATIENT
Start: 2025-05-23

## 2025-05-23 RX ORDER — INSULIN LISPRO 100 [IU]/ML
INJECTION, SOLUTION INTRAVENOUS; SUBCUTANEOUS
Qty: 3 ADJUSTABLE DOSE PRE-FILLED PEN SYRINGE | Refills: 1 | Status: SHIPPED | OUTPATIENT
Start: 2025-05-23

## 2025-05-23 NOTE — TELEPHONE ENCOUNTER
Pharmacy faxed for clarification for the insulin lispro:   \"Please verify qty, 3 boxes?-1 box is 15ml\"   Please fax answer back to [phone]

## 2025-05-23 NOTE — TELEPHONE ENCOUNTER
Patient is calling requesting a rapid insulin. Patient states that she takes Lantus solostar 100units/ 10 units daily. Blood sugar currently in the 300s no symptoms. Please advise.

## 2025-05-27 ENCOUNTER — TELEPHONE (OUTPATIENT)
Dept: FAMILY MEDICINE CLINIC | Age: 45
End: 2025-05-27

## 2025-06-16 ENCOUNTER — OFFICE VISIT (OUTPATIENT)
Dept: FAMILY MEDICINE CLINIC | Age: 45
End: 2025-06-16

## 2025-06-16 VITALS
DIASTOLIC BLOOD PRESSURE: 80 MMHG | HEART RATE: 99 BPM | HEIGHT: 64 IN | SYSTOLIC BLOOD PRESSURE: 124 MMHG | OXYGEN SATURATION: 100 % | BODY MASS INDEX: 27.66 KG/M2 | WEIGHT: 162 LBS | TEMPERATURE: 97 F

## 2025-06-16 DIAGNOSIS — E78.5 HYPERLIPIDEMIA, UNSPECIFIED HYPERLIPIDEMIA TYPE: Chronic | ICD-10-CM

## 2025-06-16 DIAGNOSIS — E11.9 TYPE 2 DIABETES MELLITUS WITHOUT COMPLICATION, WITH LONG-TERM CURRENT USE OF INSULIN (HCC): Primary | Chronic | ICD-10-CM

## 2025-06-16 DIAGNOSIS — Z79.4 TYPE 2 DIABETES MELLITUS WITHOUT COMPLICATION, WITH LONG-TERM CURRENT USE OF INSULIN (HCC): Primary | Chronic | ICD-10-CM

## 2025-06-16 PROCEDURE — 3079F DIAST BP 80-89 MM HG: CPT

## 2025-06-16 PROCEDURE — 3044F HG A1C LEVEL LT 7.0%: CPT

## 2025-06-16 PROCEDURE — 3074F SYST BP LT 130 MM HG: CPT

## 2025-06-16 PROCEDURE — 99214 OFFICE O/P EST MOD 30 MIN: CPT

## 2025-06-16 RX ORDER — INSULIN LISPRO 100 [IU]/ML
INJECTION, SOLUTION INTRAVENOUS; SUBCUTANEOUS
Qty: 3 ADJUSTABLE DOSE PRE-FILLED PEN SYRINGE | Refills: 1 | Status: SHIPPED | OUTPATIENT
Start: 2025-06-16

## 2025-06-16 RX ORDER — INSULIN GLARGINE 100 [IU]/ML
15 INJECTION, SOLUTION SUBCUTANEOUS 2 TIMES DAILY
Qty: 5 ADJUSTABLE DOSE PRE-FILLED PEN SYRINGE | Refills: 0 | Status: SHIPPED | OUTPATIENT
Start: 2025-06-16

## 2025-06-16 RX ORDER — ATORVASTATIN CALCIUM 20 MG/1
20 TABLET, FILM COATED ORAL EVERY EVENING
Qty: 90 TABLET | Refills: 1 | Status: SHIPPED | OUTPATIENT
Start: 2025-06-16 | End: 2025-12-13

## 2025-06-16 RX ORDER — GLIPIZIDE 5 MG/1
5 TABLET ORAL 2 TIMES DAILY
Qty: 60 TABLET | Refills: 3 | Status: SHIPPED | OUTPATIENT
Start: 2025-06-16

## 2025-06-16 RX ORDER — HYDROCHLOROTHIAZIDE 12.5 MG/1
CAPSULE ORAL
Qty: 2 EACH | Refills: 2 | Status: SHIPPED | OUTPATIENT
Start: 2025-06-16

## 2025-06-16 RX ORDER — PEN NEEDLE, DIABETIC 31 GX5/16"
1 NEEDLE, DISPOSABLE MISCELLANEOUS DAILY
Qty: 100 EACH | Refills: 11 | Status: SHIPPED | OUTPATIENT
Start: 2025-06-16

## 2025-06-16 SDOH — ECONOMIC STABILITY: FOOD INSECURITY: WITHIN THE PAST 12 MONTHS, YOU WORRIED THAT YOUR FOOD WOULD RUN OUT BEFORE YOU GOT MONEY TO BUY MORE.: NEVER TRUE

## 2025-06-16 SDOH — ECONOMIC STABILITY: FOOD INSECURITY: WITHIN THE PAST 12 MONTHS, THE FOOD YOU BOUGHT JUST DIDN'T LAST AND YOU DIDN'T HAVE MONEY TO GET MORE.: NEVER TRUE

## 2025-06-16 ASSESSMENT — PATIENT HEALTH QUESTIONNAIRE - PHQ9
SUM OF ALL RESPONSES TO PHQ QUESTIONS 1-9: 0
SUM OF ALL RESPONSES TO PHQ QUESTIONS 1-9: 0
2. FEELING DOWN, DEPRESSED OR HOPELESS: NOT AT ALL
1. LITTLE INTEREST OR PLEASURE IN DOING THINGS: NOT AT ALL
SUM OF ALL RESPONSES TO PHQ QUESTIONS 1-9: 0
SUM OF ALL RESPONSES TO PHQ QUESTIONS 1-9: 0

## 2025-06-16 NOTE — PROGRESS NOTES
MPHX Shanthi   [address] [facility]  [address]  6/23/2025    Raquel Dickey is a 45 y.o. female who presents today for her medical conditions and/or complaints as noted below.    Raquel Dickey is scheduled today for Diabetes (Patient states that despite taking her insulin she is still struggling to control her diabetes)  .      HPI:     History of Present Illness  The patient presents for evaluation of diabetes, menopause, and heartburn.    They report daily elevated blood glucose levels, with a reading of 374 during sleep. They have not consulted an endocrinologist. They are on rapid-acting insulin, 15-20 units TID. They cannot obtain long-acting insulin due to pharmacy unavailability. They are interested in Ozempic due to cost and lack of insurance. They request a Keren 2 sensor refill.    They report severe headaches, panic episodes, shortness of breath, and palpitations. They suspect neuropathy, with heat sensation in the soles at night, but no numbness or tingling.    FAMILY HISTORY  Father: thyroid cancer, lung cancer.    Patient has no further concerns for today's visit.  Previous notes reviewed in the chart.      Vitals:    06/16/25 1424   BP: 124/80   Pulse: 99   Temp: 97 °F (36.1 °C)   SpO2: 100%   Weight: 73.5 kg (162 lb)   Height: 1.626 m (5' 4.02\")      No past medical history on file.   No past surgical history on file.  Family History   Problem Relation Age of Onset    Diabetes Mother     Cancer Father      Social History     Tobacco Use    Smoking status: Never    Smokeless tobacco: Never   Substance Use Topics    Alcohol use: Never      Current Outpatient Medications   Medication Sig Dispense Refill    insulin glargine (LANTUS SOLOSTAR) 100 UNIT/ML injection pen Inject 15 Units into the skin 2 times daily 5 Adjustable Dose Pre-filled Pen Syringe 0    Continuous Glucose Sensor (FREESTYLE KEREN 3 PLUS SENSOR) MISC Apply 1 sensor every 15 days. 2 each 2    atorvastatin (LIPITOR) 20 MG tablet Take 1

## 2025-06-23 ASSESSMENT — ENCOUNTER SYMPTOMS
NAUSEA: 0
VOMITING: 0
SINUS PAIN: 0
DIARRHEA: 0
SINUS PRESSURE: 0
EYE ITCHING: 0
WHEEZING: 0
EYE DISCHARGE: 0
SHORTNESS OF BREATH: 0
CHEST TIGHTNESS: 0
TROUBLE SWALLOWING: 0
COUGH: 0
EYE REDNESS: 0
SORE THROAT: 0
ABDOMINAL PAIN: 0

## 2025-07-07 ENCOUNTER — TELEPHONE (OUTPATIENT)
Dept: FAMILY MEDICINE CLINIC | Age: 45
End: 2025-07-07